# Patient Record
(demographics unavailable — no encounter records)

---

## 2025-05-06 NOTE — HISTORY OF PRESENT ILLNESS
[Born at ___ Wks Gestation] : The patient was born at [unfilled] weeks gestation [] : via normal spontaneous vaginal delivery [[facility]] : at [facility] [BW: _____] : weight of [unfilled] [Length: _____] : length of [unfilled] [HC: _____] : head circumference of [unfilled] [DW: _____] : Discharge weight was [unfilled] [Age: ___] : [unfilled] year old mother [G: ___] : G [unfilled] [P: ___] : P [unfilled] [None] : There are no risk factors [Yes] : Yes [Formula ___ oz/feed] : [unfilled] oz of formula per feed [Hepatitis B Vaccine Given] : Hepatitis B vaccine given [Normal] : Normal [In Bassinet/Crib] : sleeps in bassinet/crib [On back] : sleeps on back [Rear facing car seat in back seat] : Rear facing car seat in back seat [Carbon Monoxide Detectors] : Carbon monoxide detectors at home [Pacifier] : Not using pacifier [FreeTextEntry7] : MOTHER ( SHERI- - 26 YRS) FATHER ( HARPAL- - 31 YRS) [de-identified] : 05/02/2025

## 2025-05-06 NOTE — DISCUSSION/SUMMARY
[ Infant] :  infant [ Transition] :  transition [ Care] :  care [Nutritional Adequacy] : nutritional adequacy [Parental Well-Being] : parental well-being [Safety] : safety

## 2025-05-06 NOTE — PHYSICAL EXAM
[Alert] : alert [Normocephalic] : normocephalic [Flat Open Anterior [place]] : flat open anterior fontanelle [PERRL] : PERRL [Red Reflex Bilateral] : red reflex bilateral [Normally Placed Ears] : normally placed ears [Auricles Well Formed] : auricles well formed [Clear Tympanic membranes] : clear tympanic membranes [Light reflex present] : light reflex present [Bony structures visible] : bony structures visible [Patent Auditory Canal] : patent auditory canal [Nares Patent] : nares patent [Palate Intact] : palate intact [Uvula Midline] : uvula midline [Supple, full passive range of motion] : supple, full passive range of motion [Symmetric Chest Rise] : symmetric chest rise [Clear to Auscultation Bilaterally] : clear to auscultation bilaterally [Regular Rate and Rhythm] : regular rate and rhythm [S1, S2 present] : S1, S2 present [+2 Femoral Pulses] : +2 femoral pulses [Soft] : soft [Bowel Sounds] : bowel sounds present [Umbilical Stump Dry, Clean, Intact] : umbilical stump dry, clean, intact [Normal external genitalia] : normal external genitalia [Patent Vagina] : patent vagina [Patent] : patent [Normally Placed] : normally placed [No Abnormal Lymph Nodes Palpated] : no abnormal lymph nodes palpated [Symmetric Flexed Extremities] : symmetric flexed extremities [Startle Reflex] : startle reflex present [Suck Reflex] : suck reflex present [Rooting] : rooting reflex present [Palmar Grasp] : palmar grasp present [Plantar Grasp] : plantar reflex present [Symmetric Julee] : symmetric [place] [Jaundice] : jaundice [Acute Distress] : no acute distress [Icteric sclera] : nonicteric sclera [Discharge] : no discharge [Palpable Masses] : no palpable masses [Murmurs] : no murmurs [Tender] : nontender [Distended] : not distended [Hepatomegaly] : no hepatomegaly [Splenomegaly] : no splenomegaly [Clitoromegaly] : no clitoromegaly [Bennett-Ortolani] : negative Bennett-Ortolani [Spinal Dimple] : no spinal dimple [Tuft of Hair] : no tuft of hair [de-identified] : TO MID THORACIC

## 2025-05-06 NOTE — PLAN
[TextEntry] : Has lost about 7% weight since birth.  Feed often may be every 2hours to help her regain weight.  Jaundice but level today is compatible with low risk complications.  Sunlight exposure as recommended.  Watch for any signs or symptoms of illness: fever 100.4F, vomiting, trouble breathing, feeding poorly, inconsolability.  These require immediate medical attention.  Household contacts to have immunizations UTD.  Also frequent hand-washing and avoiding crowds help keep baby healthy.

## 2025-05-09 NOTE — HISTORY OF PRESENT ILLNESS
[de-identified] : weight check and bili check; FEEDING WELL ON DEMAND NURSING AND PUMPED BREAST MILK; PIGMENTED STOOLS DAILY

## 2025-05-09 NOTE — DISCUSSION/SUMMARY
[FreeTextEntry1] : GAINED 40g IN LAST 3D;  Jaundice but level today is compatible with low risk complications.  Sunlight exposure as recommended.  Watch for any signs or symptoms of illness: fever 100.4F, vomiting, trouble breathing, feeding poorly, inconsolability.  These require immediate medical attention.  Household contacts to have immunizations UTD.  Also frequent hand-washing and avoiding crowds help keep baby healthy. ADVISED STRETCHING B/L FEET WITH EACH DIAPER CHANGE AND WILL CONT. MONITORING

## 2025-05-09 NOTE — PHYSICAL EXAM
[EOMI] : grossly EOMI [Normal External Genitalia] : normal external genitalia [Patent] : patent [Anal Fissure] : no anal fissure [NL] : normotonic [FreeTextEntry5] : ICTERIC B/L [de-identified] : R FOOT INTERNAL ROTATION >L FOOT [de-identified] : JAUNDICE TO THORACIC

## 2025-05-29 NOTE — HISTORY OF PRESENT ILLNESS
[de-identified] : THRUSH  [FreeTextEntry6] : 25 day old ex-35 weeker presenting for oral thrush concerns. Tongue is looking more white, about 3-4 days. Tried to clean with a q-tip but did not help. Starting to have more trouble taking to the bottle, which was not an issue previously.  Good WD and BMs A lot of gas. She is having more trouble at night.

## 2025-05-29 NOTE — DISCUSSION/SUMMARY
[FreeTextEntry1] : 25 day old ex-35 weeker presenting w/ start of oral thrush. Otherwise well on exam with good weight gain.  Plan: - Nystatin QID for 7-10 days - Sterilize bottles - Return as needed

## 2025-05-29 NOTE — PHYSICAL EXAM
[NL] : warm, clear [Wheezing] : no wheezing [Crackles] : no crackles [Transmitted Upper Airway Sounds] : no transmitted upper airway sounds [Tachypnea] : no tachypnea [Belly Breathing] : no belly breathing [de-identified] : Tongue white plaques, no plaques on gums

## 2025-06-03 NOTE — HISTORY OF PRESENT ILLNESS
[Mother] : mother [Breast milk] : breast milk [Formula ___ oz/feed] : [unfilled] oz of formula per feed [In Bassinet/Crib] : sleeps in bassinet/crib [On back] : sleeps on back [Rear facing car seat in back seat] : Rear facing car seat in back seat [Smoke Detectors] : Smoke detectors at home. [Hepatitis B] : Hepatitis B [Pacifier use] : not using pacifier [FreeTextEntry7] : C/O GASINESS DURING DAY AND WAKING UP EVERY HR TO FEED AT NIGHT; HAS INTRODUCED FORMULA 2D PRIOR; HAVING PIGMENTED STOOLS 2-4X PER DAY [FreeTextEntry9] : HOME  [FreeTextEntry1] : DELAYED INMMUNIZATION SCHEDULE

## 2025-06-03 NOTE — DISCUSSION/SUMMARY
[Parental Well-Being] : parental well-being [Family Adjustment] : family adjustment [Feeding Routines] : feeding routines [Infant Adjustment] : infant adjustment [Safety] : safety [de-identified] : VACCINE COUNSELING PROVIDED AND WOULD PREFER TO PROVIDE REQUIRED VACCINES NEXT MONTH [FreeTextEntry1] : GAINING WEIGHT WELL.  Watch for any signs or symptoms of illness: fever 100.4F, vomiting, trouble breathing, feeding poorly, inconsolability.  These require immediate medical attention.  Household contacts to have immunizations UTD.  Also frequent hand-washing and avoiding crowds help keep baby healthy.

## 2025-06-03 NOTE — DEVELOPMENTAL MILESTONES
[Passed] : passed [FreeTextEntry2] : 0 [Normal Development] : Normal Development [Calms when picked up or spoken to] : calms when picked up or spoken to [Looks briefly at objects] : looks briefly at objects [Alerts to unexpected sound] : alerts to unexpected sound [Makes brief short vowel sounds] : makes brief short vowel sounds [Holds chin up in prone] : holds chin up in prone [Holds fingers more open at rest] : holds fingers more open at rest

## 2025-06-03 NOTE — REVIEW OF SYSTEMS
[Negative] : Genitourinary [Fussy] : fussy [Constipation] : no constipation [Vomiting] : no vomiting [Diarrhea] : no diarrhea [Gaseous] : gaseous

## 2025-06-03 NOTE — PHYSICAL EXAM
[Alert] : alert [Acute Distress] : no acute distress [Normocephalic] : normocephalic [Flat Open Anterior [place]] : flat open anterior fontanelle [PERRL] : PERRL [Red Reflex Bilateral] : red reflex bilateral [Normally Placed Ears] : normally placed ears [Auricles Well Formed] : auricles well formed [Clear Tympanic membranes] : clear tympanic membranes [Light reflex present] : light reflex present [Bony landmarks visible] : bony landmarks visible [Discharge] : no discharge [Nares Patent] : nares patent [Palate Intact] : palate intact [Uvula Midline] : uvula midline [Supple, full passive range of motion] : supple, full passive range of motion [Palpable Masses] : no palpable masses [Symmetric Chest Rise] : symmetric chest rise [Clear to Auscultation Bilaterally] : clear to auscultation bilaterally [Regular Rate and Rhythm] : regular rate and rhythm [S1, S2 present] : S1, S2 present [Murmurs] : no murmurs [+2 Femoral Pulses] : +2 femoral pulses [Soft] : soft [Tender] : nontender [Distended] : not distended [Bowel Sounds] : bowel sounds present [Hepatomegaly] : no hepatomegaly [Splenomegaly] : no splenomegaly [Normal external genitailia] : normal external genitalia [Clitoromegaly] : no clitoromegaly [Patent Vagina] : vagina patent [Normally Placed] : normally placed [No Abnormal Lymph Nodes Palpated] : no abnormal lymph nodes palpated [Bennett-Ortolani] : negative Bennett-Ortolani [Symmetric Flexed Extremities] : symmetric flexed extremities [Spinal Dimple] : no spinal dimple [Tuft of Hair] : no tuft of hair [Startle Reflex] : startle reflex present [Suck Reflex] : suck reflex present [Rooting] : rooting reflex present [Palmar Grasp] : palmar grasp reflex present [Plantar Grasp] : plantar grasp reflex present [Symmetric Julee] : symmetric [place] [Jaundice] : no jaundice [Rash and/or lesion present] : rash and/or lesion present [de-identified] : FEW ERYTHEMATOUS MACULAR LESIONS FACE AND LOWER NECK ARE, SOME APPEAR PUSTULAR

## 2025-06-12 NOTE — HISTORY OF PRESENT ILLNESS
[de-identified] : VOMITING AFTER FEEDINGS, GREEN STOOL, YELLOW EYES; PATIENT DRINKS BREASTMILK AND KENDAMIL ORGANIC.  [FreeTextEntry6] : spitting up significantly after each feed, even when limited to 1oz (can drink upto 2) drinks every 1-2h, arches back during feeds mom noticed yellow tint of eyes; bili 14.4 on first visit, down to 12.5 next day also stool is dark emerald green

## 2025-06-12 NOTE — PLAN
[TextEntry] : excellent weight gain despite SAHARA trial of partially hydrolyzed formula --> completely hydrolyzed formula continue elevating head defer thickening feeds, pepcid to ff

## 2025-06-26 NOTE — HISTORY OF PRESENT ILLNESS
[de-identified] : blood in stool [FreeTextEntry6] : 53d F present with fussiness x2-3 weeks and blood in the stool today. Parents note child straining with BMs but stool appears with consistency of puree. Child eating well with good appetite. Child on on breastmilk supplemented with Kendamil formula (20-30% intake is formula).

## 2025-06-26 NOTE — DISCUSSION/SUMMARY
[FreeTextEntry1] : 53d F present with fussiness and blood in her stool noted today.   Plan: 1. Stool hemoccult POSITIVE.  2. Discussed plan to change formula to Alimentum (samples given to parents) and counseled mother to eliminate milk, soy and beef from her diet 3. Will reevaluate at child's next well visit in about 1 week

## 2025-07-08 NOTE — HISTORY OF PRESENT ILLNESS
[Mother] : mother [Father] : father [Formula ___ oz/feed] : [unfilled] oz of formula per feed [Expressed Breast milk ___oz/feed] : [unfilled] oz of expressed breast milk per feed [Normal] : Normal [In Bassinet/Crib] : sleeps in bassinet/crib [Rear facing car seat in back seat] : Rear facing car seat in back seat [Smoke Detectors] : Smoke detectors at home. [Hepatitis B] : Hepatitis B [Pacifier use] : not using pacifier [FreeTextEntry7] : HAD SMALL AMOUNT BLOOD IN STOOL 2 WEEKS PRIOR; CHANGED FORMULA TO ALIMENTUM; DENIES FURTHER BLOOD

## 2025-07-08 NOTE — DEVELOPMENTAL MILESTONES
[Smiles responsively] : smiles responsively [Vocalizes with simple cooing] : vocalizes with simple cooing [Lifts head and chest in prone] : lifts head and chest in prone [Opens and shuts hands] : opens and shuts hands [FreeTextEntry2] : 3

## 2025-07-08 NOTE — PHYSICAL EXAM
[Alert] : alert [Acute Distress] : no acute distress [Normocephalic] : normocephalic [Flat Open Anterior [place]] : flat open anterior fontanelle [PERRL] : PERRL [Red Reflex Bilateral] : red reflex bilateral [Normally Placed Ears] : normally placed ears [Auricles Well Formed] : auricles well formed [Clear Tympanic membranes] : clear tympanic membranes [Light reflex present] : light reflex present [Bony landmarks visible] : bony landmarks visible [Discharge] : no discharge [Nares Patent] : nares patent [Palate Intact] : palate intact [Uvula Midline] : uvula midline [Supple, full passive range of motion] : supple, full passive range of motion [Palpable Masses] : no palpable masses [Symmetric Chest Rise] : symmetric chest rise [Clear to Auscultation Bilaterally] : clear to auscultation bilaterally [Regular Rate and Rhythm] : regular rate and rhythm [S1, S2 present] : S1, S2 present [Murmurs] : no murmurs [+2 Femoral Pulses] : +2 femoral pulses [Soft] : soft [Tender] : nontender [Distended] : not distended [Bowel Sounds] : bowel sounds present [Hepatomegaly] : no hepatomegaly [Splenomegaly] : no splenomegaly [Normal external genitailia] : normal external genitalia [Normally Placed] : normally placed [No Abnormal Lymph Nodes Palpated] : no abnormal lymph nodes palpated [Bennett-Ortolani] : negative Bennett-Ortolani [Symmetric Flexed Extremities] : symmetric flexed extremities [Spinal Dimple] : no spinal dimple [Tuft of Hair] : no tuft of hair [Startle Reflex] : startle reflex present [Suck Reflex] : suck reflex present [Rooting] : rooting reflex present [Palmar Grasp] : palmar grasp reflex present [Plantar Grasp] : plantar grasp reflex present [Symmetric Julee] : symmetric [place] [Rash and/or lesion present] : no rash/lesion [de-identified] : NO FISSURES

## 2025-07-08 NOTE — PHYSICAL EXAM
[Alert] : alert [Acute Distress] : no acute distress [Normocephalic] : normocephalic [Flat Open Anterior [place]] : flat open anterior fontanelle [PERRL] : PERRL [Red Reflex Bilateral] : red reflex bilateral [Normally Placed Ears] : normally placed ears [Auricles Well Formed] : auricles well formed [Clear Tympanic membranes] : clear tympanic membranes [Light reflex present] : light reflex present [Bony landmarks visible] : bony landmarks visible [Discharge] : no discharge [Nares Patent] : nares patent [Palate Intact] : palate intact [Uvula Midline] : uvula midline [Supple, full passive range of motion] : supple, full passive range of motion [Palpable Masses] : no palpable masses [Symmetric Chest Rise] : symmetric chest rise [Clear to Auscultation Bilaterally] : clear to auscultation bilaterally [Regular Rate and Rhythm] : regular rate and rhythm [S1, S2 present] : S1, S2 present [Murmurs] : no murmurs [+2 Femoral Pulses] : +2 femoral pulses [Soft] : soft [Tender] : nontender [Distended] : not distended [Bowel Sounds] : bowel sounds present [Hepatomegaly] : no hepatomegaly [Splenomegaly] : no splenomegaly [Normal external genitailia] : normal external genitalia [Normally Placed] : normally placed [No Abnormal Lymph Nodes Palpated] : no abnormal lymph nodes palpated [Bennett-Ortolani] : negative Bennett-Ortolani [Symmetric Flexed Extremities] : symmetric flexed extremities [Spinal Dimple] : no spinal dimple [Tuft of Hair] : no tuft of hair [Startle Reflex] : startle reflex present [Suck Reflex] : suck reflex present [Rooting] : rooting reflex present [Palmar Grasp] : palmar grasp reflex present [Plantar Grasp] : plantar grasp reflex present [Symmetric Julee] : symmetric [place] [Rash and/or lesion present] : no rash/lesion [de-identified] : NO FISSURES